# Patient Record
Sex: FEMALE | Race: WHITE | Employment: OTHER | URBAN - METROPOLITAN AREA
[De-identification: names, ages, dates, MRNs, and addresses within clinical notes are randomized per-mention and may not be internally consistent; named-entity substitution may affect disease eponyms.]

---

## 2018-11-08 LAB
CHOLESTEROL, TOTAL: 189 MG/DL
CHOLESTEROL/HDL RATIO: 3
HDLC SERPL-MCNC: 62 MG/DL (ref 35–70)
LDL CHOLESTEROL CALCULATED: 103 MG/DL (ref 0–160)
TRIGL SERPL-MCNC: 144 MG/DL
VLDLC SERPL CALC-MCNC: ABNORMAL MG/DL

## 2019-04-05 VITALS
BODY MASS INDEX: 30.53 KG/M2 | TEMPERATURE: 97.9 F | DIASTOLIC BLOOD PRESSURE: 86 MMHG | HEIGHT: 65 IN | WEIGHT: 183.25 LBS | OXYGEN SATURATION: 94 % | HEART RATE: 72 BPM | SYSTOLIC BLOOD PRESSURE: 144 MMHG

## 2019-04-05 RX ORDER — OMEPRAZOLE 40 MG/1
40 CAPSULE, DELAYED RELEASE ORAL 2 TIMES DAILY
COMMUNITY

## 2019-04-05 RX ORDER — MONTELUKAST SODIUM 10 MG/1
10 TABLET ORAL NIGHTLY
COMMUNITY

## 2019-04-05 RX ORDER — ALBUTEROL SULFATE 90 UG/1
AEROSOL, METERED RESPIRATORY (INHALATION)
COMMUNITY
End: 2019-05-08

## 2019-04-05 RX ORDER — BUDESONIDE AND FORMOTEROL FUMARATE DIHYDRATE 160; 4.5 UG/1; UG/1
2 AEROSOL RESPIRATORY (INHALATION) 2 TIMES DAILY
COMMUNITY
End: 2020-05-05

## 2019-05-08 ENCOUNTER — HOSPITAL ENCOUNTER (OUTPATIENT)
Age: 64
Discharge: HOME | End: 2019-05-10
Payer: MEDICARE

## 2019-05-08 ENCOUNTER — OFFICE VISIT (OUTPATIENT)
Dept: PRIMARY CARE CLINIC | Age: 64
End: 2019-05-08
Payer: MEDICARE

## 2019-05-08 VITALS
DIASTOLIC BLOOD PRESSURE: 60 MMHG | BODY MASS INDEX: 29.88 KG/M2 | SYSTOLIC BLOOD PRESSURE: 124 MMHG | TEMPERATURE: 97.5 F | HEART RATE: 82 BPM | OXYGEN SATURATION: 95 % | WEIGHT: 175 LBS | HEIGHT: 64 IN

## 2019-05-08 DIAGNOSIS — M17.10 PRIMARY OSTEOARTHRITIS OF KNEE, UNSPECIFIED LATERALITY: Primary | ICD-10-CM

## 2019-05-08 DIAGNOSIS — K21.9 GASTROESOPHAGEAL REFLUX DISEASE WITHOUT ESOPHAGITIS: ICD-10-CM

## 2019-05-08 DIAGNOSIS — I10 ESSENTIAL HYPERTENSION: ICD-10-CM

## 2019-05-08 DIAGNOSIS — J45.20 MILD INTERMITTENT ASTHMA WITHOUT COMPLICATION: ICD-10-CM

## 2019-05-08 LAB
ALBUMIN SERPL-MCNC: 4.7 G/DL (ref 3.5–5.2)
ALP BLD-CCNC: 79 U/L (ref 35–104)
ALT SERPL-CCNC: 12 U/L (ref 0–32)
ANION GAP SERPL CALCULATED.3IONS-SCNC: 16 MMOL/L (ref 7–16)
AST SERPL-CCNC: 14 U/L (ref 0–31)
BILIRUB SERPL-MCNC: 0.7 MG/DL (ref 0–1.2)
BUN BLDV-MCNC: 13 MG/DL (ref 8–23)
CALCIUM SERPL-MCNC: 9.8 MG/DL (ref 8.6–10.2)
CHLORIDE BLD-SCNC: 104 MMOL/L (ref 98–107)
CHOLESTEROL, TOTAL: 191 MG/DL (ref 0–199)
CO2: 25 MMOL/L (ref 22–29)
CREAT SERPL-MCNC: 0.6 MG/DL (ref 0.5–1)
GFR AFRICAN AMERICAN: >60
GFR NON-AFRICAN AMERICAN: >60 ML/MIN/1.73
GLUCOSE BLD-MCNC: 102 MG/DL (ref 74–99)
HDLC SERPL-MCNC: 79 MG/DL
LDL CHOLESTEROL CALCULATED: 99 MG/DL (ref 0–99)
POTASSIUM SERPL-SCNC: 4.5 MMOL/L (ref 3.5–5)
SODIUM BLD-SCNC: 145 MMOL/L (ref 132–146)
TOTAL PROTEIN: 7.5 G/DL (ref 6.4–8.3)
TRIGL SERPL-MCNC: 66 MG/DL (ref 0–149)
VLDLC SERPL CALC-MCNC: 13 MG/DL

## 2019-05-08 PROCEDURE — 99214 OFFICE O/P EST MOD 30 MIN: CPT | Performed by: INTERNAL MEDICINE

## 2019-05-08 PROCEDURE — 80061 LIPID PANEL: CPT

## 2019-05-08 PROCEDURE — 36415 COLL VENOUS BLD VENIPUNCTURE: CPT

## 2019-05-08 PROCEDURE — 80053 COMPREHEN METABOLIC PANEL: CPT

## 2019-05-08 RX ORDER — MONTELUKAST SODIUM 10 MG/1
10 TABLET ORAL
COMMUNITY
Start: 2019-05-06 | End: 2019-05-08

## 2019-05-08 RX ORDER — BUDESONIDE AND FORMOTEROL FUMARATE DIHYDRATE 160; 4.5 UG/1; UG/1
2 AEROSOL RESPIRATORY (INHALATION)
COMMUNITY
Start: 2018-10-01 | End: 2019-05-08

## 2019-05-08 RX ORDER — LUTEIN 10 MG
TABLET ORAL
COMMUNITY
End: 2019-09-11

## 2019-05-08 RX ORDER — OMEPRAZOLE 20 MG/1
40 CAPSULE, DELAYED RELEASE ORAL
COMMUNITY
Start: 2019-05-06 | End: 2019-05-08

## 2019-05-08 RX ORDER — ALBUTEROL SULFATE 90 UG/1
AEROSOL, METERED RESPIRATORY (INHALATION)
COMMUNITY
Start: 2015-08-05

## 2019-05-08 ASSESSMENT — PATIENT HEALTH QUESTIONNAIRE - PHQ9
1. LITTLE INTEREST OR PLEASURE IN DOING THINGS: 0
SUM OF ALL RESPONSES TO PHQ QUESTIONS 1-9: 0
2. FEELING DOWN, DEPRESSED OR HOPELESS: 0
SUM OF ALL RESPONSES TO PHQ9 QUESTIONS 1 & 2: 0
SUM OF ALL RESPONSES TO PHQ QUESTIONS 1-9: 0

## 2019-05-08 ASSESSMENT — ENCOUNTER SYMPTOMS
ABDOMINAL PAIN: 0
BLOOD IN STOOL: 0
SORE THROAT: 0
PHOTOPHOBIA: 0
DIARRHEA: 0
SHORTNESS OF BREATH: 0
TROUBLE SWALLOWING: 0
EYE DISCHARGE: 0
COUGH: 0
WHEEZING: 0
FACIAL SWELLING: 0
STRIDOR: 0
NAUSEA: 0
EYE ITCHING: 0
EYE PAIN: 0
VOMITING: 0
COLOR CHANGE: 0
TROUBLE SWALLOWING: 1
ANAL BLEEDING: 0
RHINORRHEA: 0
CONSTIPATION: 0

## 2019-05-08 NOTE — PROGRESS NOTES
2019    Name: Billie Gary : 1955 Sex: female  Age: 70 y.o. Subjective:    Chief Complaint   Patient presents with    Other     6m follow up       HPI: Patient is back from [place]. She went  to [facility] and saw her pulmonologist a couple of days ago. Reviewed reports. Is trying to switch her to a Advair HFA instead of the Symbicort. Last  she saw a gastroenterologist and he kept her on omeprazole 40 mg twice a day. I reviewed with her the risk of osteoporosis and respiratory infection with omeprazole continued use. However she has severe GERD with some difficulty swallowing at times. This was the recommendation that she stay on this. She also had a mammogram last Friday. We'll review the results. She  is questioning whether she could wean herself off metoprolol as she joined [employer] and has lost between 15 and 20 pounds. We will discuss weaning protocol with her. She has had no episodes of asthma. Review of Systems   Constitutional: Negative for appetite change, fatigue and unexpected weight change. HENT: Positive for trouble swallowing. Negative for congestion, ear pain, facial swelling, rhinorrhea, sore throat and tinnitus. Eyes: Negative for photophobia, pain, discharge, itching and visual disturbance. Respiratory: Negative for cough, shortness of breath, wheezing and stridor. Cardiovascular: Negative for chest pain, palpitations and leg swelling. Gastrointestinal: Negative for abdominal pain, anal bleeding, blood in stool, constipation, diarrhea, nausea and vomiting. Endocrine: Negative for cold intolerance, heat intolerance, polydipsia, polyphagia and polyuria. Genitourinary: Negative for difficulty urinating, dysuria, flank pain, frequency, hematuria and urgency. Musculoskeletal: Negative for arthralgias, gait problem, joint swelling and myalgias. Skin: Negative for color change, pallor and rash.    Allergic/Immunologic: Positive for environmental allergies. Negative for food allergies. Neurological: Negative for dizziness, tremors, seizures, syncope, speech difficulty, weakness, light-headedness, numbness and headaches. Hematological: Negative for adenopathy. Does not bruise/bleed easily. Psychiatric/Behavioral: Negative for agitation, behavioral problems, confusion, sleep disturbance and suicidal ideas. The patient is not nervous/anxious.            Current Outpatient Medications:     Multiple Vitamin (MULTI-VITAMIN DAILY PO), , Disp: , Rfl:     fluticasone-salmeterol (ADVAIR HFA) 230-21 MCG/ACT inhaler, Inhale 2 puffs into the lungs, Disp: , Rfl:     Lutein 10 MG TABS, , Disp: , Rfl:     albuterol sulfate HFA (PROAIR HFA) 108 (90 Base) MCG/ACT inhaler, Inhale into the lungs, Disp: , Rfl:     omeprazole (PRILOSEC) 40 MG delayed release capsule, 40 mg 2 times daily 2 by mouth every day , Disp: , Rfl:     metoprolol tartrate (LOPRESSOR) 25 MG tablet, Take 25 mg by mouth 2 times daily, Disp: , Rfl:     montelukast (SINGULAIR) 10 MG tablet, Take 10 mg by mouth nightly, Disp: , Rfl:     budesonide-formoterol (SYMBICORT) 160-4.5 MCG/ACT AERO, Inhale 1 puff into the lungs 2 times daily, Disp: , Rfl:     FLUTICASONE PROPIONATE, NASAL, NA, 2 sprays each nostril daily at bedtime, Disp: , Rfl:      No Known Allergies     Past Medical History:   Diagnosis Date    Asthma     GERD (gastroesophageal reflux disease)     Hypertension        Health Maintenance Due   Topic Date Due    Potassium monitoring  1955    Creatinine monitoring  1955    Hepatitis C screen  1955    HIV screen  10/21/1968    DTaP/Tdap/Td vaccine (1 - Tdap) 10/21/1972    Cervical cancer screen  10/21/1974    Diabetes screen  10/21/1993    Shingles Vaccine (2 of 3) 04/22/2015    DEXA (modify frequency per FRAX score)  10/21/2018    Breast cancer screen  04/06/2019        Patient Active Problem List   Diagnosis    Essential hypertension    Mild intermittent asthma without complication    Gastroesophageal reflux disease without esophagitis    Primary osteoarthritis of knee        Past Surgical History:   Procedure Laterality Date    APPENDECTOMY      KNEE ARTHROSCOPY Left     NEUROMA SURGERY Left     acoustic    TONSILLECTOMY AND ADENOIDECTOMY          Family History   Problem Relation Age of Onset    Stroke Mother     Breast Cancer Mother         Social History     Tobacco History     Smoking Status  Never Smoker    Smokeless Tobacco Use  Never Used          Alcohol History     Alcohol Use Status  Yes Comment  social, 1 glass of wine a day           Drug Use     Drug Use Status  Not Asked          Sexual Activity     Sexually Active  Not Asked                 Objective  Vitals:    05/08/19 0822   BP: 124/60   Pulse: 82   Temp: 97.5 °F (36.4 °C)   TempSrc: Temporal   SpO2: 95%   Weight: 175 lb (79.4 kg)   Height: 5' 4\" (1.626 m)        Exam:  Physical Exam   Constitutional: She is oriented to person, place, and time. She appears well-developed and well-nourished. HENT:   Head: Normocephalic. Right Ear: External ear normal.   Left Ear: External ear normal.   Nose: Nose normal.   Mouth/Throat: Oropharynx is clear and moist. No oropharyngeal exudate. Eyes: Pupils are equal, round, and reactive to light. Conjunctivae and EOM are normal. Right eye exhibits no discharge. Left eye exhibits no discharge. No scleral icterus. Neck: Normal range of motion. Neck supple. No thyromegaly present. Cardiovascular: Normal rate, regular rhythm, normal heart sounds and intact distal pulses. Exam reveals no gallop and no friction rub. No murmur heard. Pulmonary/Chest: Effort normal and breath sounds normal. No respiratory distress. She has no wheezes. She has no rales. She exhibits no tenderness. Abdominal: Soft. Bowel sounds are normal. She exhibits no distension and no mass. There is no tenderness. There is no rebound and no guarding.    Musculoskeletal: Normal range of motion. She exhibits no edema, tenderness or deformity. Lymphadenopathy:     She has no cervical adenopathy. Neurological: She is alert and oriented to person, place, and time. She displays normal reflexes. No cranial nerve deficit or sensory deficit. Skin: Skin is warm and dry. No rash noted. No erythema. No pallor. Psychiatric: She has a normal mood and affect. Her behavior is normal. Judgment and thought content normal.        Last labs reviewed. ASSESSMENT & PLAN :  Essential hypertension  Cut back metoprolol to 25 mg once a day for 2 weeks, then Monday Wednesday and Friday for 2 weeks. Monitor blood pressures and if they are less than 130/80 she can stay off the metoprolol. Continue to monitor blood pressures on occasion. Blood work to be done today to include CMP and lipid profile    Mild intermittent asthma without complication  Continue with switching over from Symbicort to Advair HFA as recommended by her pulmonologist.    Gastroesophageal reflux disease without esophagitis  Discussed risks of continued omeprazole therapy with patient. Because of her persistent symptoms of GERD we'll continue with present dose of omeprazole. She has followed up with GI    Primary osteoarthritis of knee  She has followed up with Dr. Mavis Monae. She is not ready for knee replacements yet. Return in about 6 months (around 11/8/2019).     Asia Restrepo, DO  5/8/2019

## 2019-05-08 NOTE — PROGRESS NOTES
, Rfl:     metoprolol tartrate (LOPRESSOR) 25 MG tablet, Take 25 mg by mouth 2 times daily, Disp: , Rfl:     montelukast (SINGULAIR) 10 MG tablet, Take 10 mg by mouth nightly, Disp: , Rfl:     budesonide-formoterol (SYMBICORT) 160-4.5 MCG/ACT AERO, Inhale 1 puff into the lungs 2 times daily, Disp: , Rfl:     albuterol sulfate  (90 Base) MCG/ACT inhaler, 2 puffs every 4-6 hours as needed, Disp: , Rfl:     FLUTICASONE PROPIONATE, NASAL, NA, 2 sprays each nostril daily at bedtime, Disp: , Rfl:      Allergies not on file     Past Medical History:   Diagnosis Date    Asthma     GERD (gastroesophageal reflux disease)     Hypertension        Health Maintenance Due   Topic Date Due    Hepatitis C screen  1955    HIV screen  10/21/1968    DTaP/Tdap/Td vaccine (1 - Tdap) 10/21/1972    Cervical cancer screen  10/21/1974    Diabetes screen  10/21/1993    Shingles Vaccine (1 of 2) 10/21/2003    DEXA (modify frequency per FRAX score)  10/21/2018    Breast cancer screen  04/06/2019        Patient Active Problem List   Diagnosis    Essential hypertension    Mild intermittent asthma without complication    Gastroesophageal reflux disease without esophagitis        Past Surgical History:   Procedure Laterality Date    APPENDECTOMY      KNEE ARTHROSCOPY Left     NEUROMA SURGERY Left     acoustic    TONSILLECTOMY AND ADENOIDECTOMY          Family History   Problem Relation Age of Onset    Stroke Mother     Breast Cancer Mother         Social History     Tobacco History     Smoking Status  Never Smoker    Smokeless Tobacco Use  Unknown          Alcohol History     Alcohol Use Status  Yes Comment  social, very rare           Drug Use     Drug Use Status  Not Asked          Sexual Activity     Sexually Active  Not Asked                 Objective  There were no vitals filed for this visit. Exam:  Const: Appears healthy,well developed and well nourished. Appears to weigh within normal range.   ENMT: External ears WNL. Auditory canals normal. Lips appear normal and healthy. Dentition is  normal. Oropharynx: Appears normal. Posterior pharynx shows no exudate, irritation or redness. Neck: Supple and symmetric. Trachea midline. No JVD. Resp: Chest expansion is symmetrical. Respirations are unlabored. Respiration rate is normal.  Percussion reveals normal tones. Clear to auscultation. CV: No gallop or rubs. Carotids: 2+ and equal bilaterally, without bruits. Heart rhythm is regular. Extremities: No clubbing, cyanosis or edema. Abdomen: Bowel sounds are normoactive. Palpation of the abdomen reveals softness, but no  tenderness or guarding. No abdominal masses. No palpable hernias. No palpable  hepatosplenomegaly. Musculo: Walks with a normal gait and station. Spine: Motor strength is intact. No asymmetry of the  muscles or laxity of the muscles. Upper Extremities: No asymmetry, clubbing, cyanosis or deformity of  the upper extremities bilaterally. Strength: Normal and symmetric. Normal muscle tone bilaterally with  no laxity bilaterally. Full ROM bilaterally. Lower Extremities: No asymmetry, clubbing, cyanosis,  deformity or discoloration of the lower extremities. Strength: normal and symmetric. Normal muscle  tone bilaterally, but no laxity bilaterally. Full ROM bilaterally. Skin: Dry and warm with no lesion, rash or ulcer. No induration, subcutaneous nodularity. Neuro: Alert and oriented x3. Mood is normal. Affect is normal. Speech is articulate and fluent. Sensation intact to pinprick. Reflexes: DTR's are intact, symmetric and 2+ bilaterally. Psych: Patient's affect is appropriate. Recent and remote memory intact. Judgement is intact. Insight  is intact. Last labs reviewed.       Diagnoses and all orders for this visit:    Essential hypertension    Mild intermittent asthma without complication    Gastroesophageal reflux disease without esophagitis           Tawanna Ovalle DO  5/8/2019

## 2019-05-08 NOTE — ASSESSMENT & PLAN NOTE
Cut back metoprolol to 25 mg once a day for 2 weeks, then Monday Wednesday and Friday for 2 weeks. Monitor blood pressures and if they are less than 130/80 she can stay off the metoprolol. Continue to monitor blood pressures on occasion.  Blood work to be done today to include CMP and lipid profile

## 2019-05-08 NOTE — ASSESSMENT & PLAN NOTE
Discussed risks of continued omeprazole therapy with patient. Because of her persistent symptoms of GERD we'll continue with present dose of omeprazole.  She has followed up with GI 1 person assist/verbal cues/set-up required/nonverbal cues (demo/gestures)/supervision

## 2019-06-05 ENCOUNTER — TELEPHONE (OUTPATIENT)
Dept: PRIMARY CARE CLINIC | Age: 64
End: 2019-06-05

## 2019-06-06 DIAGNOSIS — G89.29 CHRONIC KNEE PAIN, UNSPECIFIED LATERALITY: Primary | ICD-10-CM

## 2019-06-06 DIAGNOSIS — M25.569 CHRONIC KNEE PAIN, UNSPECIFIED LATERALITY: Primary | ICD-10-CM

## 2019-09-11 ENCOUNTER — OFFICE VISIT (OUTPATIENT)
Dept: PRIMARY CARE CLINIC | Age: 64
End: 2019-09-11
Payer: MEDICARE

## 2019-09-11 VITALS
TEMPERATURE: 98.9 F | WEIGHT: 185 LBS | HEIGHT: 65 IN | SYSTOLIC BLOOD PRESSURE: 132 MMHG | DIASTOLIC BLOOD PRESSURE: 84 MMHG | RESPIRATION RATE: 18 BRPM | HEART RATE: 66 BPM | OXYGEN SATURATION: 95 % | BODY MASS INDEX: 30.82 KG/M2

## 2019-09-11 DIAGNOSIS — R05.3 CHRONIC COUGH: ICD-10-CM

## 2019-09-11 DIAGNOSIS — I10 ESSENTIAL HYPERTENSION: ICD-10-CM

## 2019-09-11 DIAGNOSIS — J45.20 MILD INTERMITTENT ASTHMA WITHOUT COMPLICATION: ICD-10-CM

## 2019-09-11 DIAGNOSIS — K21.9 GASTROESOPHAGEAL REFLUX DISEASE WITHOUT ESOPHAGITIS: ICD-10-CM

## 2019-09-11 DIAGNOSIS — J40 BRONCHITIS: Primary | ICD-10-CM

## 2019-09-11 DIAGNOSIS — G47.33 OSA (OBSTRUCTIVE SLEEP APNEA): ICD-10-CM

## 2019-09-11 PROBLEM — J30.9 ALLERGIC RHINITIS: Status: ACTIVE | Noted: 2019-09-11

## 2019-09-11 PROCEDURE — 99213 OFFICE O/P EST LOW 20 MIN: CPT | Performed by: INTERNAL MEDICINE

## 2019-09-11 RX ORDER — LUTEIN 10 MG
TABLET ORAL
COMMUNITY

## 2019-09-11 RX ORDER — AMOXICILLIN AND CLAVULANATE POTASSIUM 875; 125 MG/1; MG/1
1 TABLET, FILM COATED ORAL 2 TIMES DAILY
Qty: 20 TABLET | Refills: 0 | Status: SHIPPED | OUTPATIENT
Start: 2019-09-11 | End: 2019-09-21

## 2019-09-11 RX ORDER — PREDNISONE 20 MG/1
20 TABLET ORAL DAILY
Qty: 5 TABLET | Refills: 0 | Status: SHIPPED | OUTPATIENT
Start: 2019-09-11 | End: 2019-09-16

## 2019-09-11 ASSESSMENT — ENCOUNTER SYMPTOMS
SHORTNESS OF BREATH: 0
COUGH: 1
SORE THROAT: 0
CHEST TIGHTNESS: 0
ABDOMINAL PAIN: 0
RHINORRHEA: 0

## 2019-09-11 NOTE — ASSESSMENT & PLAN NOTE
Trial of prednisone 20 mg daily for 5 days. If cough no better after she finishes this call me and we will put her on longer dose.   Watch for GI upset

## 2019-09-16 ENCOUNTER — TELEPHONE (OUTPATIENT)
Dept: PRIMARY CARE CLINIC | Age: 64
End: 2019-09-16

## 2019-09-16 DIAGNOSIS — J32.9 OTHER SINUSITIS, UNSPECIFIED CHRONICITY: ICD-10-CM

## 2019-09-16 DIAGNOSIS — E11.65 TYPE 2 DIABETES MELLITUS WITH HYPERGLYCEMIA, WITHOUT LONG-TERM CURRENT USE OF INSULIN (HCC): Primary | ICD-10-CM

## 2019-09-16 DIAGNOSIS — J40 BRONCHITIS: ICD-10-CM

## 2019-09-16 RX ORDER — CEFDINIR 300 MG/1
300 CAPSULE ORAL 2 TIMES DAILY
Qty: 10 CAPSULE | Refills: 0 | Status: SHIPPED | OUTPATIENT
Start: 2019-09-16 | End: 2019-09-21

## 2019-09-16 RX ORDER — SITAGLIPTIN 50 MG/1
50 TABLET, FILM COATED ORAL DAILY
Qty: 30 TABLET | Refills: 3
Start: 2019-09-16 | End: 2019-09-16 | Stop reason: CLARIF

## 2019-09-16 NOTE — TELEPHONE ENCOUNTER
Patient called, she says you instructed her to call back if her augmentin is not \"strong enough. \" She is calling back for this reason. She says she finished half of the rx and doesn't feel any better. She is asking you send a stronger rx to [address] [place] [address] in [facility].

## 2019-11-13 ENCOUNTER — OFFICE VISIT (OUTPATIENT)
Dept: PRIMARY CARE CLINIC | Age: 64
End: 2019-11-13
Payer: MEDICARE

## 2019-11-13 ENCOUNTER — HOSPITAL ENCOUNTER (OUTPATIENT)
Age: 64
Discharge: HOME | End: 2019-11-15
Payer: MEDICARE

## 2019-11-13 ENCOUNTER — TELEPHONE (OUTPATIENT)
Dept: PRIMARY CARE CLINIC | Age: 64
End: 2019-11-13

## 2019-11-13 VITALS
TEMPERATURE: 98.4 F | HEART RATE: 62 BPM | HEIGHT: 65 IN | SYSTOLIC BLOOD PRESSURE: 118 MMHG | BODY MASS INDEX: 31.49 KG/M2 | RESPIRATION RATE: 16 BRPM | OXYGEN SATURATION: 97 % | WEIGHT: 189 LBS | DIASTOLIC BLOOD PRESSURE: 68 MMHG

## 2019-11-13 VITALS
OXYGEN SATURATION: 97 % | HEART RATE: 62 BPM | BODY MASS INDEX: 31.49 KG/M2 | SYSTOLIC BLOOD PRESSURE: 118 MMHG | HEIGHT: 65 IN | RESPIRATION RATE: 16 BRPM | TEMPERATURE: 98.4 F | DIASTOLIC BLOOD PRESSURE: 68 MMHG | WEIGHT: 189 LBS

## 2019-11-13 DIAGNOSIS — I10 ESSENTIAL HYPERTENSION: ICD-10-CM

## 2019-11-13 DIAGNOSIS — J30.9 ALLERGIC RHINITIS, UNSPECIFIED SEASONALITY, UNSPECIFIED TRIGGER: ICD-10-CM

## 2019-11-13 DIAGNOSIS — R73.9 HYPERGLYCEMIA: ICD-10-CM

## 2019-11-13 DIAGNOSIS — Z13.1 SCREENING FOR DIABETES MELLITUS (DM): ICD-10-CM

## 2019-11-13 DIAGNOSIS — K21.9 GASTROESOPHAGEAL REFLUX DISEASE WITHOUT ESOPHAGITIS: ICD-10-CM

## 2019-11-13 DIAGNOSIS — I10 ESSENTIAL HYPERTENSION: Primary | ICD-10-CM

## 2019-11-13 DIAGNOSIS — Z78.0 MENOPAUSE: ICD-10-CM

## 2019-11-13 DIAGNOSIS — J45.20 MILD INTERMITTENT ASTHMA WITHOUT COMPLICATION: ICD-10-CM

## 2019-11-13 DIAGNOSIS — Z00.00 ROUTINE GENERAL MEDICAL EXAMINATION AT A HEALTH CARE FACILITY: Primary | ICD-10-CM

## 2019-11-13 LAB
ALBUMIN SERPL-MCNC: 4.5 G/DL (ref 3.5–5.2)
ALP BLD-CCNC: 66 U/L (ref 35–104)
ALT SERPL-CCNC: 19 U/L (ref 0–32)
ANION GAP SERPL CALCULATED.3IONS-SCNC: 6 MMOL/L (ref 7–16)
AST SERPL-CCNC: 16 U/L (ref 0–31)
BILIRUB SERPL-MCNC: 0.2 MG/DL (ref 0–1.2)
BUN BLDV-MCNC: 14 MG/DL (ref 8–23)
CALCIUM SERPL-MCNC: 9.8 MG/DL (ref 8.6–10.2)
CHLORIDE BLD-SCNC: 104 MMOL/L (ref 98–107)
CO2: 32 MMOL/L (ref 22–29)
CREAT SERPL-MCNC: 0.7 MG/DL (ref 0.5–1)
GFR AFRICAN AMERICAN: >60
GFR NON-AFRICAN AMERICAN: >60 ML/MIN/1.73
GLUCOSE BLD-MCNC: 103 MG/DL (ref 74–99)
HBA1C MFR BLD: 5.3 % (ref 4–5.6)
POTASSIUM SERPL-SCNC: 4.2 MMOL/L (ref 3.5–5)
SODIUM BLD-SCNC: 142 MMOL/L (ref 132–146)
TOTAL PROTEIN: 7.1 G/DL (ref 6.4–8.3)

## 2019-11-13 PROCEDURE — G8400 PT W/DXA NO RESULTS DOC: HCPCS | Performed by: INTERNAL MEDICINE

## 2019-11-13 PROCEDURE — G8427 DOCREV CUR MEDS BY ELIG CLIN: HCPCS | Performed by: INTERNAL MEDICINE

## 2019-11-13 PROCEDURE — G8417 CALC BMI ABV UP PARAM F/U: HCPCS | Performed by: INTERNAL MEDICINE

## 2019-11-13 PROCEDURE — 4040F PNEUMOC VAC/ADMIN/RCVD: CPT | Performed by: INTERNAL MEDICINE

## 2019-11-13 PROCEDURE — 1123F ACP DISCUSS/DSCN MKR DOCD: CPT | Performed by: INTERNAL MEDICINE

## 2019-11-13 PROCEDURE — 36415 COLL VENOUS BLD VENIPUNCTURE: CPT

## 2019-11-13 PROCEDURE — 83036 HEMOGLOBIN GLYCOSYLATED A1C: CPT

## 2019-11-13 PROCEDURE — G8484 FLU IMMUNIZE NO ADMIN: HCPCS | Performed by: INTERNAL MEDICINE

## 2019-11-13 PROCEDURE — G0439 PPPS, SUBSEQ VISIT: HCPCS | Performed by: INTERNAL MEDICINE

## 2019-11-13 PROCEDURE — 1036F TOBACCO NON-USER: CPT | Performed by: INTERNAL MEDICINE

## 2019-11-13 PROCEDURE — 3017F COLORECTAL CA SCREEN DOC REV: CPT | Performed by: INTERNAL MEDICINE

## 2019-11-13 PROCEDURE — 99213 OFFICE O/P EST LOW 20 MIN: CPT | Performed by: INTERNAL MEDICINE

## 2019-11-13 PROCEDURE — 1090F PRES/ABSN URINE INCON ASSESS: CPT | Performed by: INTERNAL MEDICINE

## 2019-11-13 PROCEDURE — 80053 COMPREHEN METABOLIC PANEL: CPT

## 2019-11-13 ASSESSMENT — ENCOUNTER SYMPTOMS
SHORTNESS OF BREATH: 0
VOMITING: 0
FACIAL SWELLING: 0
COLOR CHANGE: 0
DIARRHEA: 0
SORE THROAT: 0
COUGH: 0
ABDOMINAL PAIN: 0
BLOOD IN STOOL: 0
NAUSEA: 0
ANAL BLEEDING: 0
EYE ITCHING: 0
WHEEZING: 0
TROUBLE SWALLOWING: 0
EYE PAIN: 0
STRIDOR: 0
PHOTOPHOBIA: 0
EYE DISCHARGE: 0
RHINORRHEA: 0
CONSTIPATION: 0

## 2019-11-13 ASSESSMENT — LIFESTYLE VARIABLES
HOW MANY STANDARD DRINKS CONTAINING ALCOHOL DO YOU HAVE ON A TYPICAL DAY: 0
HAS A RELATIVE, FRIEND, DOCTOR, OR ANOTHER HEALTH PROFESSIONAL EXPRESSED CONCERN ABOUT YOUR DRINKING OR SUGGESTED YOU CUT DOWN: 0
HOW OFTEN DURING THE LAST YEAR HAVE YOU FOUND THAT YOU WERE NOT ABLE TO STOP DRINKING ONCE YOU HAD STARTED: 0
HOW OFTEN DO YOU HAVE SIX OR MORE DRINKS ON ONE OCCASION: 0
HOW OFTEN DURING THE LAST YEAR HAVE YOU BEEN UNABLE TO REMEMBER WHAT HAPPENED THE NIGHT BEFORE BECAUSE YOU HAD BEEN DRINKING: 0
AUDIT-C TOTAL SCORE: 2
HOW OFTEN DO YOU HAVE A DRINK CONTAINING ALCOHOL: 2
HOW OFTEN DURING THE LAST YEAR HAVE YOU FAILED TO DO WHAT WAS NORMALLY EXPECTED FROM YOU BECAUSE OF DRINKING: 0
HOW OFTEN DURING THE LAST YEAR HAVE YOU NEEDED AN ALCOHOLIC DRINK FIRST THING IN THE MORNING TO GET YOURSELF GOING AFTER A NIGHT OF HEAVY DRINKING: 0
AUDIT TOTAL SCORE: 2
HOW OFTEN DURING THE LAST YEAR HAVE YOU HAD A FEELING OF GUILT OR REMORSE AFTER DRINKING: 0
HAVE YOU OR SOMEONE ELSE BEEN INJURED AS A RESULT OF YOUR DRINKING: 0

## 2019-11-13 ASSESSMENT — PATIENT HEALTH QUESTIONNAIRE - PHQ9
SUM OF ALL RESPONSES TO PHQ QUESTIONS 1-9: 0
SUM OF ALL RESPONSES TO PHQ QUESTIONS 1-9: 0

## 2019-12-13 PROBLEM — Z00.00 ROUTINE GENERAL MEDICAL EXAMINATION AT A HEALTH CARE FACILITY: Status: RESOLVED | Noted: 2019-11-13 | Resolved: 2019-12-13

## 2019-12-13 PROBLEM — Z13.1 SCREENING FOR DIABETES MELLITUS (DM): Status: RESOLVED | Noted: 2019-11-13 | Resolved: 2019-12-13

## 2020-05-05 ENCOUNTER — VIRTUAL VISIT (OUTPATIENT)
Dept: PRIMARY CARE CLINIC | Age: 65
End: 2020-05-05
Payer: MEDICARE

## 2020-05-05 PROCEDURE — 99214 OFFICE O/P EST MOD 30 MIN: CPT | Performed by: INTERNAL MEDICINE

## 2020-05-05 ASSESSMENT — ENCOUNTER SYMPTOMS
TROUBLE SWALLOWING: 0
SORE THROAT: 0
COLOR CHANGE: 0
ANAL BLEEDING: 0
WHEEZING: 0
NAUSEA: 0
CONSTIPATION: 0
EYE ITCHING: 0
BLOOD IN STOOL: 0
DIARRHEA: 0
EYE PAIN: 0
SHORTNESS OF BREATH: 0
VOMITING: 0
STRIDOR: 0
PHOTOPHOBIA: 0
COUGH: 0
ABDOMINAL PAIN: 0
EYE DISCHARGE: 0
FACIAL SWELLING: 0
RHINORRHEA: 0

## 2020-05-05 NOTE — PROGRESS NOTES
TELEHEALTH EVALUATION -- Audio/Visual (During [id number] public health emergency)    HPI:    Shelley Lieberman (:  1955) has requested an audio/video evaluation for the following concern(s):    Hypertension    Patient is a history of hypertension well-controlled on metoprolol tartrate 25 mg twice daily  , She has a history of asthma on Dulera 200/5 2 puffs every 12 hours and rescue inhaler for albuterol HFA. She sees a pulmonologist at [facility] [place] clinic. She has a history of allergic rhinitis and takes fluticasone nasal spray regularly. She also is on montelukast for this 10 mg at night  She has a history of GERD and takes omeprazole 40 mg twice a day. In 2019 she had a precancerous lesion removed by her that by Dr. Zan Gomez and will be seeing him in follow-up. Blood work in 2019 showed fasting blood sugar of 103 and hemoglobin A1c of 5.3%. In May 2019 her fasting lipid profile was normal with a total cholesterol of 191, LDL cholesterol of 99 and an HDL cholesterol of 73. She is due for blood work which she will get done sometime in . Her mammogram is due. She wants to get this done later this year. She also needs her DEXA scan done. We will go ahead and send this over to [facility]. Joined weight watchers in 2020 and has lost a total of 37 pounds. She is now down to 157.2 pounds her blood pressures have been excellent before she takes the metoprolol in the morning they run 134/79 after she takes her medication they go down to 115/70. Review of Systems   Constitutional: Negative for appetite change, fatigue and unexpected weight change. HENT: Negative for congestion, ear pain, facial swelling, rhinorrhea, sore throat, tinnitus and trouble swallowing. Eyes: Negative for photophobia, pain, discharge, itching and visual disturbance. Respiratory: Negative for cough, shortness of breath, wheezing and stridor.     Cardiovascular: Negative NASAL, NA 2 sprays each nostril daily at bedtime Yes Historical Provider, MD       Social History     Tobacco Use    Smoking status: Never Smoker    Smokeless tobacco: Never Used   Substance Use Topics    Alcohol use: Yes     Comment: social, very rare     Drug use: Never            PHYSICAL EXAMINATION:  [ INSTRUCTIONS:  \"[x]\" Indicates a positive item  \"[]\" Indicates a negative item  -- DELETE ALL ITEMS NOT EXAMINED]  Vital Signs: (As obtained by patient/caregiver or practitioner observation)    Blood pressure-134/79  Heart rate- 60   Respiratory rate-16   Temperature-  Pulse oximetry-     Constitutional: [] Appears well-developed and well-nourished [] No apparent distress      [] Abnormal-   Mental status  [x] Alert and awake  [x] Oriented to person/place/time [x]Able to follow commands      Eyes:  EOM    [x]  Normal  [] Abnormal-  Sclera  [x]  Normal  [] Abnormal -         Discharge [x]  None visible  [] Abnormal -    HENT:   [] Normocephalic, atraumatic. [] Abnormal   [x] Mouth/Throat: Mucous membranes are moist.     External Ears [x] Normal  [] Abnormal-     Neck: [x] No visualized mass     Pulmonary/Chest: [x] Respiratory effort normal.  [x] No visualized signs of difficulty breathing or respiratory distress        [] Abnormal-      Musculoskeletal:            [x] Normal range of motion of neck        [] Abnormal-       Neurological:        [x] No Facial Asymmetry (Cranial nerve 7 motor function) (limited exam to video visit)          [x] No gaze palsy        [] Abnormal-         Skin:        [x] No significant exanthematous lesions or discoloration noted on facial skin         [] Abnormal-            Psychiatric:       [x] Normal Affect [x] No Hallucinations        [] Abnormal-     Other pertinent observable physical exam findings-     ASSESSMENT/PLAN:  1. Mild intermittent asthma without complication  Continue inhalers. Continue follow-up with pulmonologist    2.  Essential hypertension  Blood virtually using Doxy. me    --Destiney Modi DO on 5/5/2020 at 9:32 AM    An electronic signature was used to authenticate this note.

## 2020-06-25 ENCOUNTER — HOSPITAL ENCOUNTER (OUTPATIENT)
Age: 65
Discharge: HOME | End: 2020-06-27
Payer: MEDICARE

## 2020-06-25 LAB
ALBUMIN SERPL-MCNC: 4.3 G/DL (ref 3.5–5.2)
ALP BLD-CCNC: 60 U/L (ref 35–104)
ALT SERPL-CCNC: 14 U/L (ref 0–32)
ANION GAP SERPL CALCULATED.3IONS-SCNC: 12 MMOL/L (ref 7–16)
AST SERPL-CCNC: 15 U/L (ref 0–31)
BILIRUB SERPL-MCNC: 0.3 MG/DL (ref 0–1.2)
BUN BLDV-MCNC: 11 MG/DL (ref 8–23)
CALCIUM SERPL-MCNC: 9.4 MG/DL (ref 8.6–10.2)
CHLORIDE BLD-SCNC: 105 MMOL/L (ref 98–107)
CHOLESTEROL, TOTAL: 166 MG/DL (ref 0–199)
CO2: 25 MMOL/L (ref 22–29)
CREAT SERPL-MCNC: 0.7 MG/DL (ref 0.5–1)
GFR AFRICAN AMERICAN: >60
GFR NON-AFRICAN AMERICAN: >60 ML/MIN/1.73
GLUCOSE BLD-MCNC: 114 MG/DL (ref 74–99)
HDLC SERPL-MCNC: 78 MG/DL
LDL CHOLESTEROL CALCULATED: 75 MG/DL (ref 0–99)
POTASSIUM SERPL-SCNC: 4.1 MMOL/L (ref 3.5–5)
SODIUM BLD-SCNC: 142 MMOL/L (ref 132–146)
TOTAL PROTEIN: 6.7 G/DL (ref 6.4–8.3)
TRIGL SERPL-MCNC: 65 MG/DL (ref 0–149)
VLDLC SERPL CALC-MCNC: 13 MG/DL

## 2020-06-25 PROCEDURE — 36415 COLL VENOUS BLD VENIPUNCTURE: CPT

## 2020-06-25 PROCEDURE — 80061 LIPID PANEL: CPT

## 2020-06-25 PROCEDURE — 80053 COMPREHEN METABOLIC PANEL: CPT

## 2020-09-21 ENCOUNTER — TELEPHONE (OUTPATIENT)
Dept: PRIMARY CARE CLINIC | Age: 65
End: 2020-09-21

## 2020-09-21 NOTE — TELEPHONE ENCOUNTER
Last Appointment:  5/5/2020  Future Appointments   Date Time Provider Karissa Ellis   11/5/2020 10:00 AM [address] [facility], . [address] [facility]      Patient left message she had flu shot done at pharmacy. They recommended she have Prevnar 23 done. She had 13 done last year 02/2019. Please advise.     Electronically signed by Arelis Toro LPN on 4/23/1941 at 58:11 AM

## 2020-09-21 NOTE — TELEPHONE ENCOUNTER
She had an Pneumovax which is the pneumonia 23  at my office on 11/24/2014.   She does need a booster but it should be given next year which will be 7 years from the time she was given her first Pneumovax

## 2020-10-01 NOTE — TELEPHONE ENCOUNTER
Patient informed of pneumonia booster for next year. Agreeable.     Electronically signed by Tamia Nolan LPN on 7/94/6319 at 56:00 AM Mr. Morris is a 19 year old athletically built M with a non-contributory PMHx presents to the ED with a 1 day history of back and bilateral shoulder discomfort.  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  Patient endorses adequately hydrating himself and having breakfast at 7AM prior to his workout.   At around 8AM, patient had his usual workout with resistance bands, and this was for one hour.  At football practice an hour later, the patient was doing 50 yard sprints.  He completed three sprints before he endorsed having a "crampy" sensation in his upper back that radiated to his bilateral shoulders, which was intermittent in duration and resolved spontaneously.  The patient saw the team doctor regarding these symptoms, who recommended further hydration.  The patient subsequently developed similar discomfort and a crampy sensation on his hamstrings bilaterally. The team doctor then recommended the patient to come to the ED.  Patient denies chest pain, sob, flank pain bilaterally, lower extremity pain, n/v/d, fevers rigors, sick contacts, or recent recreational drug use.     In the ED: CBC was unremarkable. CMP demonstrated elevated LFTs (530/201 AST/ALT) and a CK of 42K.  Patient was given a 2L and then a 3L bolus of LR.

## 2020-11-09 ENCOUNTER — VIRTUAL VISIT (OUTPATIENT)
Dept: PRIMARY CARE CLINIC | Age: 65
End: 2020-11-09
Payer: MEDICARE

## 2020-11-09 PROCEDURE — 99213 OFFICE O/P EST LOW 20 MIN: CPT | Performed by: INTERNAL MEDICINE

## 2020-11-09 ASSESSMENT — ENCOUNTER SYMPTOMS
RHINORRHEA: 0
DIARRHEA: 0
FACIAL SWELLING: 0
CONSTIPATION: 0
EYE DISCHARGE: 0
WHEEZING: 0
BLOOD IN STOOL: 0
SHORTNESS OF BREATH: 0
EYE PAIN: 0
ABDOMINAL PAIN: 0
SORE THROAT: 0
EYE ITCHING: 0
TROUBLE SWALLOWING: 0
COUGH: 0
COLOR CHANGE: 0
ANAL BLEEDING: 0
STRIDOR: 0
VOMITING: 0
NAUSEA: 0
PHOTOPHOBIA: 0

## 2020-11-09 ASSESSMENT — PATIENT HEALTH QUESTIONNAIRE - PHQ9
SUM OF ALL RESPONSES TO PHQ QUESTIONS 1-9: 0
1. LITTLE INTEREST OR PLEASURE IN DOING THINGS: 0
SUM OF ALL RESPONSES TO PHQ QUESTIONS 1-9: 0
SUM OF ALL RESPONSES TO PHQ9 QUESTIONS 1 & 2: 0
SUM OF ALL RESPONSES TO PHQ QUESTIONS 1-9: 0
2. FEELING DOWN, DEPRESSED OR HOPELESS: 0

## 2020-12-31 ENCOUNTER — TELEMEDICINE (OUTPATIENT)
Dept: PRIMARY CARE CLINIC | Age: 65
End: 2020-12-31
Payer: MEDICARE

## 2020-12-31 PROCEDURE — 3017F COLORECTAL CA SCREEN DOC REV: CPT | Performed by: INTERNAL MEDICINE

## 2020-12-31 PROCEDURE — 4040F PNEUMOC VAC/ADMIN/RCVD: CPT | Performed by: INTERNAL MEDICINE

## 2020-12-31 PROCEDURE — G0439 PPPS, SUBSEQ VISIT: HCPCS | Performed by: INTERNAL MEDICINE

## 2020-12-31 PROCEDURE — G8482 FLU IMMUNIZE ORDER/ADMIN: HCPCS | Performed by: INTERNAL MEDICINE

## 2020-12-31 PROCEDURE — 1123F ACP DISCUSS/DSCN MKR DOCD: CPT | Performed by: INTERNAL MEDICINE

## 2020-12-31 ASSESSMENT — PATIENT HEALTH QUESTIONNAIRE - PHQ9
1. LITTLE INTEREST OR PLEASURE IN DOING THINGS: 0
SUM OF ALL RESPONSES TO PHQ QUESTIONS 1-9: 0
2. FEELING DOWN, DEPRESSED OR HOPELESS: 0
SUM OF ALL RESPONSES TO PHQ QUESTIONS 1-9: 0
SUM OF ALL RESPONSES TO PHQ QUESTIONS 1-9: 0
SUM OF ALL RESPONSES TO PHQ9 QUESTIONS 1 & 2: 0

## 2020-12-31 ASSESSMENT — LIFESTYLE VARIABLES: HOW OFTEN DO YOU HAVE A DRINK CONTAINING ALCOHOL: 0

## 2020-12-31 NOTE — PROGRESS NOTES
Cardiovascular Disease Risk Counseling: Assessed the patient's risk to develop cardiovascular disease and reviewed main risk factors. Reviewed steps to reduce disease risk including:   · Quitting tobacco use, reducing amount smoked, or not starting the habit  · Making healthy food choices  · Being physically active and gradualy increasing activity levels   · Reduce weight and determine a healthy BMI goal  · Monitor blood pressure and treat if higher than 140/90 mmHg  · Maintain blood total cholesterol levels under 5 mmol/l or 190 mg/dl  · Maintain LDL cholesterol levels under 3.0 mmol/l or 115 mg/dl   · Control blood glucose levels  · Consider taking aspirin (75 mg daily), once blood pressure is controlled   Provided a follow up plan. Time spent (minutes):   Medicare Annual Wellness Visit  Name: Carlos Rey Date: 2020   MRN: <[id number]> Sex: Female   Age: 77 y.o. Ethnicity: Non-/Non    : 1955 Race: Triston Hugo is here for Medicare AWV    Screenings for behavioral, psychosocial and functional/safety risks, and cognitive dysfunction are all negative except as indicated below. These results, as well as other patient data from the [address] form, are documented in Flowsheets linked to this Encounter. No Known Allergies    Prior to Visit Medications    Medication Sig Taking?  Authorizing Provider   mometasone-formoterol (Kip Maykel) 200-5 MCG/ACT inhaler Inhale 2 puffs into the lungs every 12 hours Yes Historical Provider, MD   Multiple Vitamins-Minerals (PRESERVISION AREDS 2+MULTI VIT PO) Take by mouth Yes Historical Provider, MD   Lutein 10 MG TABS  Yes Historical Provider, MD   Multiple Vitamin (MULTI-VITAMIN DAILY PO) Take 1 tablet by mouth daily  Yes Historical Provider, MD   albuterol sulfate HFA (PROAIR HFA) 108 (90 Base) MCG/ACT inhaler Inhale into the lungs Yes Historical Provider, MD In general, how would you say your health is?: Excellent  In the past 7 days, have you experienced any of the following? New or Increased Pain, New or Increased Fatigue, Loneliness, Social Isolation, Stress or Anger?: None of These  Do you get the social and emotional support that you need?: Yes  Do you have a Living Will?: Yes  Advance Directives     Power of [address] Will ACP-Advance Directive ACP-Power of     Not on File Not on File Not on File Not on File      General Health Risk Interventions:  · Patient has a living will and [address] for healthcare.   She will bring it in for us to scan into her chart    Health Habits/Nutrition:  Health Habits/Nutrition  Do you exercise for at least 20 minutes 2-3 times per week?: Yes  Have you lost any weight without trying in the past 3 months?: No  Do you eat fewer than 2 meals per day?: No  Have you seen a dentist within the past year?: Yes     Health Habits/Nutrition Interventions:  · No risk factors identified       Personalized Preventive Plan   Current Health Maintenance Status  Immunization History   Administered Date(s) Administered    Hepatitis A 05/14/2018    Hepatitis A Adult (Havrix, Vaqta) 05/14/2018    Influenza Vaccine, unspecified formulation 11/09/2017    Influenza Virus Vaccine 11/09/2017, 08/13/2019    Influenza, High Dose (Fluzone 65 yrs and older) 11/07/2018    Influenza, Quadv, IM, PF (6 mo and older Fluzone, Flulaval, Fluarix, and 3 yrs and older Afluria) 09/19/2020    Influenza, Quadv, adjuvanted, 65 yrs +, IM, PF (Fluad) 09/19/2020    Meningococcal ACWY Vaccine 05/14/2018    Pneumococcal Conjugate 13-valent ([id number]) 02/04/2019    Pneumococcal Polysaccharide ([id number]) 11/22/2014, 11/11/2020    Tdap (Boostrix, Adacel) 06/28/2014    Zoster Live (Zostavax) 02/25/2015    Zoster Recombinant (Shingrix) 08/13/2019, 11/14/2019        Health Maintenance   Topic Date Due    Hepatitis C screen  1955 Services were provided through a video synchronous discussion virtually to substitute for in-person clinic visit. Patient and provider were located at their individual homes. --Eugenie Arana DO on 12/31/2020 at 8:24 AM    An electronic signature was used to authenticate this note.

## 2020-12-31 NOTE — PATIENT INSTRUCTIONS
Personalized Preventive Plan for Brian Lindquist - 12/31/2020  Medicare offers a range of preventive health benefits. Some of the tests and screenings are paid in full while other may be subject to a deductible, co-insurance, and/or copay. Some of these benefits include a comprehensive review of your medical history including lifestyle, illnesses that may run in your family, and various assessments and screenings as appropriate. After reviewing your medical record and screening and assessments performed today your provider may have ordered immunizations, labs, imaging, and/or referrals for you. A list of these orders (if applicable) as well as your Preventive Care list are included within your After Visit Summary for your review. Other Preventive Recommendations:    · A preventive eye exam performed by an eye specialist is recommended every 1-2 years to screen for glaucoma; cataracts, macular degeneration, and other eye disorders. · A preventive dental visit is recommended every 6 months. · Try to get at least 150 minutes of exercise per week or 10,000 steps per day on a pedometer . · Order or download the FREE \"Exercise & Physical Activity: Your Everyday Guide\" from [employer]. Call 6-479.857.1088 or search [employer] online. · You need 6707-1121 mg of calcium and 7219-9496 IU of vitamin D per day. It is possible to meet your calcium requirement with diet alone, but a vitamin D supplement is usually necessary to meet this goal.  · When exposed to the sun, use a sunscreen that protects against both UVA and UVB radiation with an SPF of 30 or greater. Reapply every 2 to 3 hours or after sweating, drying off with a towel, or swimming. · Always wear a seat belt when traveling in a car. Always wear a helmet when riding a bicycle or motorcycle.

## 2021-08-09 ENCOUNTER — OFFICE VISIT (OUTPATIENT)
Dept: PRIMARY CARE CLINIC | Age: 66
End: 2021-08-09
Payer: MEDICARE

## 2021-08-09 VITALS
BODY MASS INDEX: 30.32 KG/M2 | RESPIRATION RATE: 16 BRPM | OXYGEN SATURATION: 96 % | HEIGHT: 65 IN | SYSTOLIC BLOOD PRESSURE: 132 MMHG | HEART RATE: 70 BPM | WEIGHT: 182 LBS | DIASTOLIC BLOOD PRESSURE: 70 MMHG

## 2021-08-09 DIAGNOSIS — R73.9 HYPERGLYCEMIA: ICD-10-CM

## 2021-08-09 DIAGNOSIS — I10 ESSENTIAL HYPERTENSION: Primary | ICD-10-CM

## 2021-08-09 DIAGNOSIS — J45.20 MILD INTERMITTENT ASTHMA WITHOUT COMPLICATION: ICD-10-CM

## 2021-08-09 DIAGNOSIS — G47.33 OSA (OBSTRUCTIVE SLEEP APNEA): ICD-10-CM

## 2021-08-09 DIAGNOSIS — Z23 NEED FOR TETANUS BOOSTER: ICD-10-CM

## 2021-08-09 DIAGNOSIS — J30.9 ALLERGIC RHINITIS, UNSPECIFIED SEASONALITY, UNSPECIFIED TRIGGER: ICD-10-CM

## 2021-08-09 DIAGNOSIS — Z12.31 ENCOUNTER FOR SCREENING MAMMOGRAM FOR MALIGNANT NEOPLASM OF BREAST: ICD-10-CM

## 2021-08-09 DIAGNOSIS — R23.3 ECCHYMOSES, SPONTANEOUS: ICD-10-CM

## 2021-08-09 DIAGNOSIS — K21.9 GASTROESOPHAGEAL REFLUX DISEASE WITHOUT ESOPHAGITIS: ICD-10-CM

## 2021-08-09 PROCEDURE — 4040F PNEUMOC VAC/ADMIN/RCVD: CPT | Performed by: INTERNAL MEDICINE

## 2021-08-09 PROCEDURE — 1123F ACP DISCUSS/DSCN MKR DOCD: CPT | Performed by: INTERNAL MEDICINE

## 2021-08-09 PROCEDURE — 3017F COLORECTAL CA SCREEN DOC REV: CPT | Performed by: INTERNAL MEDICINE

## 2021-08-09 PROCEDURE — G8417 CALC BMI ABV UP PARAM F/U: HCPCS | Performed by: INTERNAL MEDICINE

## 2021-08-09 PROCEDURE — G8399 PT W/DXA RESULTS DOCUMENT: HCPCS | Performed by: INTERNAL MEDICINE

## 2021-08-09 PROCEDURE — 99214 OFFICE O/P EST MOD 30 MIN: CPT | Performed by: INTERNAL MEDICINE

## 2021-08-09 PROCEDURE — 1090F PRES/ABSN URINE INCON ASSESS: CPT | Performed by: INTERNAL MEDICINE

## 2021-08-09 PROCEDURE — G8427 DOCREV CUR MEDS BY ELIG CLIN: HCPCS | Performed by: INTERNAL MEDICINE

## 2021-08-09 PROCEDURE — 1036F TOBACCO NON-USER: CPT | Performed by: INTERNAL MEDICINE

## 2021-08-09 RX ORDER — LISINOPRIL 10 MG/1
10 TABLET ORAL
COMMUNITY

## 2021-08-09 SDOH — ECONOMIC STABILITY: FOOD INSECURITY: WITHIN THE PAST 12 MONTHS, YOU WORRIED THAT YOUR FOOD WOULD RUN OUT BEFORE YOU GOT MONEY TO BUY MORE.: NEVER TRUE

## 2021-08-09 SDOH — ECONOMIC STABILITY: FOOD INSECURITY: WITHIN THE PAST 12 MONTHS, THE FOOD YOU BOUGHT JUST DIDN'T LAST AND YOU DIDN'T HAVE MONEY TO GET MORE.: NEVER TRUE

## 2021-08-09 ASSESSMENT — ENCOUNTER SYMPTOMS
FACIAL SWELLING: 0
WHEEZING: 0
ANAL BLEEDING: 0
BLOOD IN STOOL: 0
EYE ITCHING: 0
ABDOMINAL PAIN: 0
VOMITING: 0
EYE DISCHARGE: 0
DIARRHEA: 0
COUGH: 0
SORE THROAT: 0
CONSTIPATION: 0
ROS SKIN COMMENTS: EASY BRUISABILITY
PHOTOPHOBIA: 0
STRIDOR: 0
RHINORRHEA: 0
NAUSEA: 0
COLOR CHANGE: 0
SHORTNESS OF BREATH: 0
TROUBLE SWALLOWING: 0
EYE PAIN: 0

## 2021-08-09 ASSESSMENT — PATIENT HEALTH QUESTIONNAIRE - PHQ9
SUM OF ALL RESPONSES TO PHQ QUESTIONS 1-9: 0
SUM OF ALL RESPONSES TO PHQ QUESTIONS 1-9: 0
SUM OF ALL RESPONSES TO PHQ9 QUESTIONS 1 & 2: 0
2. FEELING DOWN, DEPRESSED OR HOPELESS: 0
SUM OF ALL RESPONSES TO PHQ QUESTIONS 1-9: 0
1. LITTLE INTEREST OR PLEASURE IN DOING THINGS: 0

## 2021-08-09 ASSESSMENT — SOCIAL DETERMINANTS OF HEALTH (SDOH): HOW HARD IS IT FOR YOU TO PAY FOR THE VERY BASICS LIKE FOOD, HOUSING, MEDICAL CARE, AND HEATING?: NOT HARD AT ALL

## 2021-08-09 NOTE — ASSESSMENT & PLAN NOTE
Blood pressures are stable. Continue medications and monitor blood pressures at home. Call office if systolics are over 763 over diastolics over 90.   Check fasting CMP
check CBC, pro time and PTT.   Any abnormalities seen refer to hematologist
continue her high-dose omeprazole as recommended by pulmonary.   Watch kidney functions and magnesium
continue inhalers and montelukast and follow-up with pulmonologist
continue over-the-counter medications and Flonase
mammogram requisition given to patient
tetanus requisition sent to pharmacy
use her CPAP nightly as she gets good results
Unknown

## 2021-08-09 NOTE — PROGRESS NOTES
2021    Name: Julian Begum : 1955 Sex: female  Age: 77 y.o. Subjective:  Chief Complaint   Patient presents with    Hypertension        HPI     Patient will be moving to [facility]. She will need a copy of her medical records sent to her new physician    Patient has a history of hypertension, asthma under the care of Dr. Markel Pelletier pulmonologist at [facility]. She saw his nurse practitioner recently and no medications were changed. History of GERD with severe waterbrash and eructation. She is on high-dose omeprazole 40 mg twice daily but every time she tries to wean down to a lower dose she gets increased symptoms. Pulmonologist finally told her just to stay on the omeprazole. We will need to keep it eye on her kidney functions. She has a history of obstructive sleep apnea and uses her CPAP machine every night and gets good results with this. She needs a booster tetanus shot and a mammogram done    Last colonoscopy was in 2017 with Dr. Austen León. She had numerous polyps which were all biopsied and apparently were benign and she will need a repeat colonoscopy next year. She will be seeing her dermatologist for her yearly skin exam    She has noticed easy bruisability. The bruises on her legs were usually caused by hitting herself lightly with by accident with her golf clubs. She has some bruising in her arms which are spontaneous. Review of Systems   Constitutional: Negative for appetite change, fatigue and unexpected weight change. HENT: Negative for congestion, ear pain, facial swelling, rhinorrhea, sore throat, tinnitus and trouble swallowing. Eyes: Negative for photophobia, pain, discharge, itching and visual disturbance. Respiratory: Negative for cough, shortness of breath, wheezing and stridor. Cardiovascular: Negative for chest pain, palpitations and leg swelling.    Gastrointestinal: Negative for abdominal pain, anal bleeding, blood in stool, FLUTICASONE PROPIONATE, NASAL, NA, 2 sprays each nostril daily at bedtime, Disp: , Rfl:     lisinopril (PRINIVIL;ZESTRIL) 10 MG tablet, Take 10 mg by mouth, Disp: , Rfl:     Aclidinium Bromide (TUDORZA PRESSAIR IN), Inhale into the lungs, Disp: , Rfl:      No Known Allergies     Past Medical History:   Diagnosis Date    Allergic rhinitis 9/11/2019    Essential hypertension 10/19/2017    Gastroesophageal reflux disease without esophagitis 5/8/2019    Mild intermittent asthma without complication 3/2/8590    CARLTON (obstructive sleep apnea) 9/11/2019       Health Maintenance Due   Topic Date Due    Hepatitis C screen  Never done    Potassium monitoring  06/25/2021    Creatinine monitoring  06/25/2021    Breast cancer screen  06/26/2021        Patient Active Problem List   Diagnosis    Essential hypertension    Mild intermittent asthma without complication    Gastroesophageal reflux disease without esophagitis    Primary osteoarthritis of knee    Allergic rhinitis    Chronic cough    CARLTON (obstructive sleep apnea)    Bronchitis    Hyperglycemia    Menopause    Encounter for screening mammogram for malignant neoplasm of breast    Need for tetanus booster    Ecchymoses, spontaneous        Past Surgical History:   Procedure Laterality Date    APPENDECTOMY      KNEE ARTHROSCOPY Left     NEUROMA SURGERY Left     acoustic    SKIN BIOPSY      On neck. Precancerous lesion on 8/22/2019    TONSILLECTOMY AND ADENOIDECTOMY          Family History   Problem Relation Age of Onset    Stroke Mother     Breast Cancer Mother         Social History     Tobacco Use    Smoking status: Never Smoker    Smokeless tobacco: Never Used   Substance Use Topics    Alcohol use: Yes     Comment: social, very rare     Drug use: Never        Objective  Vitals:    08/09/21 0756   BP: 132/70   Pulse: 70   Resp: 16   SpO2: 96%   Weight: 182 lb (82.6 kg)   Height: 5' 5\" (1.651 m)        Exam:  Physical Exam  Vitals reviewed. Constitutional:       General: She is not in acute distress. Appearance: She is well-developed. She is not ill-appearing. HENT:      Head: Normocephalic. Right Ear: External ear normal.      Left Ear: External ear normal.      Nose: Nose normal.      Mouth/Throat:      Pharynx: No oropharyngeal exudate. Eyes:      General: No scleral icterus. Right eye: No discharge. Left eye: No discharge. Conjunctiva/sclera: Conjunctivae normal.      Pupils: Pupils are equal, round, and reactive to light. Neck:      Thyroid: No thyromegaly. Cardiovascular:      Rate and Rhythm: Normal rate and regular rhythm. Heart sounds: Normal heart sounds. No murmur heard. No friction rub. No gallop. Pulmonary:      Effort: Pulmonary effort is normal. No respiratory distress. Breath sounds: Normal breath sounds. No wheezing or rales. Chest:      Chest wall: No tenderness. Abdominal:      General: Bowel sounds are normal. There is no distension. Palpations: Abdomen is soft. There is no mass. Tenderness: There is no abdominal tenderness. There is no guarding or rebound. Musculoskeletal:         General: No tenderness or deformity. Normal range of motion. Cervical back: Normal range of motion and neck supple. Lymphadenopathy:      Cervical: No cervical adenopathy. Skin:     General: Skin is warm and dry. Coloration: Skin is not pale. Findings: Bruising present. No erythema or rash. Comments: Few ecchymoses on her lower extremities. She has some small ones in her upper extremities and also has what looks like age spots on her upper extremities. Neurological:      Mental Status: She is alert and oriented to person, place, and time. Cranial Nerves: No cranial nerve deficit. Sensory: No sensory deficit. Deep Tendon Reflexes: Reflexes normal.   Psychiatric:         Behavior: Behavior normal.         Thought Content:  Thought content normal. Judgment: Judgment normal.          Last labs reviewed. ASSESSMENT & PLAN :   Problem List        Circulatory    Essential hypertension - Primary     Blood pressures are stable. Continue medications and monitor blood pressures at home. Call office if systolics are over 757 over diastolics over 90. Check fasting CMP         Relevant Orders    Comprehensive Metabolic Panel       Respiratory    Mild intermittent asthma without complication       continue inhalers and montelukast and follow-up with pulmonologist         Relevant Medications    montelukast (SINGULAIR) 10 MG tablet    albuterol sulfate HFA (PROAIR HFA) 108 (90 Base) MCG/ACT inhaler    mometasone-formoterol (DULERA) 200-5 MCG/ACT inhaler    Aclidinium Bromide (TUDORZA PRESSAIR IN)    Allergic rhinitis       continue over-the-counter medications and Flonase         Relevant Medications    montelukast (SINGULAIR) 10 MG tablet    FLUTICASONE PROPIONATE, NASAL, NA    albuterol sulfate HFA (PROAIR HFA) 108 (90 Base) MCG/ACT inhaler    mometasone-formoterol (DULERA) 200-5 MCG/ACT inhaler    Aclidinium Bromide (TUDORZA PRESSAIR IN)    CARLTON (obstructive sleep apnea)       use her CPAP nightly as she gets good results             Digestive    Gastroesophageal reflux disease without esophagitis       continue her high-dose omeprazole as recommended by pulmonary. Watch kidney functions and magnesium         Relevant Medications    omeprazole (PRILOSEC) 40 MG delayed release capsule       Other    Hyperglycemia       last fasting blood sugar was 114.   Check hemoglobin A1c to look for underlying diabetes         Relevant Orders    Hemoglobin A1C    Encounter for screening mammogram for malignant neoplasm of breast       mammogram requisition given to patient         Relevant Orders    RENETTA DIGITAL SCREEN W OR WO CAD BILATERAL    Need for tetanus booster       tetanus requisition sent to pharmacy         Relevant Medications    Tetanus-Diphth-Acell Pertussis ([address]) 5-2.5-18.5 LF-MCG/0.5 injection    Ecchymoses, spontaneous       check CBC, pro time and PTT. Any abnormalities seen refer to hematologist         Relevant Orders    CBC Auto Differential    PROTIME-INR    APTT           Return as needed, moving to [place].        Cher Salomon,   8/9/2021

## 2021-09-08 PROBLEM — Z12.31 ENCOUNTER FOR SCREENING MAMMOGRAM FOR MALIGNANT NEOPLASM OF BREAST: Status: RESOLVED | Noted: 2021-08-09 | Resolved: 2021-09-08

## 2022-11-09 NOTE — PROGRESS NOTES
TELEHEALTH VIDEO VISIT    HPI:    Lucia Contreras (:  1955) has requested an audio/video evaluation for the following concern(s):    Chief Complaint   Patient presents with    6 Month Follow-Up     Patient has a history of hypertension on metoprolol tartrate 25 mg twice daily. She says her blood pressures are good at home. She has a history of asthma under the care of Dr[facility]. She has her on Dulera, Singulair and as needed albuterol. She has a history of allergic rhinitis and uses fluticasone nasal spray. She also has a history of gastritis and takes omeprazole 40 mg twice daily. In early November she had problems with diarrhea, arthralgias myalgias and fatigue. She called her pulmonologist then [facility] where blood work was done. This was unremarkable and Covid 19 test was negative. He took about 7 days before she improved. Now she is asymptomatic. Should be noted that her son also had similar symptoms. She had her flu shot she needs Pneumovax 23. We will send the requisition to her drugstore in [facility]. She still needs her annual wellness visit. Review of Systems   Constitutional: Negative for appetite change, fatigue and unexpected weight change. HENT: Negative for congestion, ear pain, facial swelling, rhinorrhea, sore throat, tinnitus and trouble swallowing. Eyes: Negative for photophobia, pain, discharge, itching and visual disturbance. Respiratory: Negative for cough, shortness of breath, wheezing and stridor. Cardiovascular: Negative for chest pain, palpitations and leg swelling. Gastrointestinal: Negative for abdominal pain, anal bleeding, blood in stool, constipation, diarrhea, nausea and vomiting. Endocrine: Negative for cold intolerance, heat intolerance, polydipsia, polyphagia and polyuria. Genitourinary: Negative for difficulty urinating, dysuria, flank pain, frequency, hematuria and urgency. Musculoskeletal: Negative for arthralgias, gait problem, joint swelling and myalgias. Skin: Negative for color change, pallor and rash. Allergic/Immunologic: Negative for environmental allergies and food allergies. Neurological: Negative for dizziness, tremors, seizures, syncope, speech difficulty, weakness, light-headedness, numbness and headaches. Hematological: Negative for adenopathy. Does not bruise/bleed easily. Psychiatric/Behavioral: Negative for agitation, behavioral problems, confusion, sleep disturbance and suicidal ideas. The patient is not nervous/anxious. Prior to Visit Medications    Medication Sig Taking?  Authorizing Provider   mometasone-formoterol (DULERA) 200-5 MCG/ACT inhaler Inhale 2 puffs into the lungs every 12 hours Yes Historical Provider, MD   Multiple Vitamins-Minerals (PRESERVISION AREDS 2+MULTI VIT PO) Take by mouth Yes Historical Provider, MD   Lutein 10 MG TABS  Yes Historical Provider, MD   Multiple Vitamin (MULTI-VITAMIN DAILY PO) Take 1 tablet by mouth daily  Yes Historical Provider, MD   albuterol sulfate HFA (PROAIR HFA) 108 (90 Base) MCG/ACT inhaler Inhale into the lungs Yes Historical Provider, MD   omeprazole (PRILOSEC) 40 MG delayed release capsule 40 mg 2 times daily 2 by mouth every day  Yes Historical Provider, MD   metoprolol tartrate (LOPRESSOR) 25 MG tablet Take 25 mg by mouth 2 times daily Yes Historical Provider, MD   montelukast (SINGULAIR) 10 MG tablet Take 10 mg by mouth nightly Yes Historical Provider, MD   FLUTICASONE PROPIONATE, NASAL, NA 2 sprays each nostril daily at bedtime Yes Historical Provider, MD       Social History     Tobacco Use    Smoking status: Never Smoker    Smokeless tobacco: Never Used   Substance Use Topics    Alcohol use: Yes     Comment: social, very rare     Drug use: Never            PHYSICAL EXAMINATION:  [ INSTRUCTIONS:  \"[x]\" Indicates a positive item  \"[]\" Indicates a negative item  -- DELETE ALL ITEMS NOT EXAMINED]  Vital Signs: (As obtained by patient/caregiver or practitioner observation)    Blood pressure- 108/74   Height 5 ft 4-1/2 in, weight 158 lbs    Constitutional: [x] Appears well-developed and well-nourished [x] No apparent distress      [] Abnormal-   Mental status  [x] Alert and awake  [x] Oriented to person/place/time [x]Able to follow commands      Eyes:  EOM    [x]  Normal  [] Abnormal-  Sclera  [x]  Normal  [] Abnormal -         Discharge [x]  None visible  [] Abnormal -    HENT:   [x] Normocephalic, atraumatic. [] Abnormal   [x] Mouth/Throat: Mucous membranes are moist.     External Ears [x] Normal  [] Abnormal-     Neck: [x] No visualized mass     Pulmonary/Chest: [x] Respiratory effort normal.  [x] No visualized signs of difficulty breathing or respiratory distress        [] Abnormal-      Musculoskeletal:            [x] Normal range of motion of neck        [] Abnormal-       Neurological:        [x] No Facial Asymmetry (Cranial nerve 7 motor function) (limited exam to video visit)          [x] No gaze palsy        [] Abnormal-         Skin:        [x] No significant exanthematous lesions or discoloration noted on facial skin         [] Abnormal-            Psychiatric:       [x] Normal Affect [x] No Hallucinations        [] Abnormal-     Other pertinent observable physical exam findings-     ASSESSMENT/PLAN:  1. Essential hypertension  Continue medication, continue monitoring her blood pressure    2. Need for vaccination with PVAX (pneumococcal vaccination)  Pneumovax requisition sent to pharmacy of choice  - Pneumococcal polysaccharide vaccine 23-valent greater than or equal to 1yo subcutaneous/IM; Future    3: Asthma  Continue medications and follow-up with pulmonologist    Return in about 7 weeks (around 12/29/2020), or AWV by video visit. TeleMedicine video visit    This visit was performed as a virtual video visit using a synchronous, two-way, audio-video telehealth technology platform. Patient identification was verified at the start of the visit, including the patient's telephone number and physical location. I discussed with the patient the nature of our telehealth visits, that:     1. Due to the nature of an audio- video modality, the only components of a physical exam that could be done are the elements supported by direct observation. 2. I would evaluate the patient and recommend diagnostics and treatments based on my assessment. 3. If it was felt that the patient should be evaluated in clinic or an emergency room setting, then they would be directed there. 4. Our sessions are not being recorded and that personal health information is protected. 5. Our team would provide follow up care in person if/when the patient needs it. does  Patient  agree to proceed with telemedicine consultation. Patient's location: home address in [facility]  Physician  location home address in [facility] other people involved in call none        Time spent: Greater than Not billed by time    This visit was completed virtually using [employer] me    --Bobbi Moctezuma DO on 11/9/2020 at 2:48 PM    An electronic signature was used to authenticate this note. 2 seconds or less